# Patient Record
Sex: MALE | Race: WHITE | NOT HISPANIC OR LATINO | URBAN - METROPOLITAN AREA
[De-identification: names, ages, dates, MRNs, and addresses within clinical notes are randomized per-mention and may not be internally consistent; named-entity substitution may affect disease eponyms.]

---

## 2017-01-25 ENCOUNTER — OUTPATIENT (OUTPATIENT)
Dept: OUTPATIENT SERVICES | Facility: HOSPITAL | Age: 82
LOS: 1 days | Discharge: HOME | End: 2017-01-25
Payer: MEDICARE

## 2017-01-25 DIAGNOSIS — M25.552 PAIN IN LEFT HIP: ICD-10-CM

## 2017-01-25 PROCEDURE — 73552 X-RAY EXAM OF FEMUR 2/>: CPT | Mod: 26,LT

## 2017-01-25 PROCEDURE — 73502 X-RAY EXAM HIP UNI 2-3 VIEWS: CPT | Mod: 26,LT

## 2017-01-26 ENCOUNTER — OUTPATIENT (OUTPATIENT)
Dept: OUTPATIENT SERVICES | Facility: HOSPITAL | Age: 82
LOS: 1 days | Discharge: HOME | End: 2017-01-26
Payer: MEDICARE

## 2017-01-26 DIAGNOSIS — R52 PAIN, UNSPECIFIED: ICD-10-CM

## 2017-01-26 PROCEDURE — 73700 CT LOWER EXTREMITY W/O DYE: CPT | Mod: 26,LT

## 2017-01-26 PROCEDURE — 72192 CT PELVIS W/O DYE: CPT | Mod: 26

## 2017-01-26 PROCEDURE — 76377 3D RENDER W/INTRP POSTPROCES: CPT | Mod: 26

## 2017-02-07 ENCOUNTER — OUTPATIENT (OUTPATIENT)
Dept: OUTPATIENT SERVICES | Facility: HOSPITAL | Age: 82
LOS: 1 days | Discharge: HOME | End: 2017-02-07
Payer: MEDICARE

## 2017-02-07 DIAGNOSIS — J18.9 PNEUMONIA, UNSPECIFIED ORGANISM: ICD-10-CM

## 2017-02-07 PROCEDURE — 71010: CPT | Mod: 26

## 2017-02-10 ENCOUNTER — OUTPATIENT (OUTPATIENT)
Dept: OUTPATIENT SERVICES | Facility: HOSPITAL | Age: 82
LOS: 1 days | Discharge: HOME | End: 2017-02-10
Payer: MEDICARE

## 2017-02-10 DIAGNOSIS — M25.552 PAIN IN LEFT HIP: ICD-10-CM

## 2017-02-10 PROCEDURE — 73502 X-RAY EXAM HIP UNI 2-3 VIEWS: CPT | Mod: 26,LT

## 2017-02-12 ENCOUNTER — OUTPATIENT (OUTPATIENT)
Dept: OUTPATIENT SERVICES | Facility: HOSPITAL | Age: 82
LOS: 1 days | Discharge: HOME | End: 2017-02-12
Payer: MEDICARE

## 2017-02-12 DIAGNOSIS — I95.9 HYPOTENSION, UNSPECIFIED: ICD-10-CM

## 2017-02-12 LAB
FLUAV SPEC QL CULT: NEGATIVE — SIGNIFICANT CHANGE UP
FLUBV AG SPEC QL IA: NEGATIVE — SIGNIFICANT CHANGE UP

## 2017-02-12 PROCEDURE — 71010: CPT | Mod: 26

## 2017-02-23 ENCOUNTER — OUTPATIENT (OUTPATIENT)
Dept: OUTPATIENT SERVICES | Facility: HOSPITAL | Age: 82
LOS: 1 days | Discharge: HOME | End: 2017-02-23
Payer: MEDICARE

## 2017-02-23 DIAGNOSIS — J18.9 PNEUMONIA, UNSPECIFIED ORGANISM: ICD-10-CM

## 2017-02-23 PROCEDURE — 71010: CPT | Mod: 26

## 2017-03-04 ENCOUNTER — INPATIENT (INPATIENT)
Facility: HOSPITAL | Age: 82
LOS: 2 days | Discharge: HOME | DRG: 191 | End: 2017-03-07
Attending: HOSPITALIST | Admitting: INTERNAL MEDICINE
Payer: MEDICARE

## 2017-03-04 ENCOUNTER — APPOINTMENT (OUTPATIENT)
Dept: INTERNAL MEDICINE | Facility: CLINIC | Age: 82
End: 2017-03-04

## 2017-03-04 VITALS
WEIGHT: 179.9 LBS | HEART RATE: 80 BPM | RESPIRATION RATE: 18 BRPM | TEMPERATURE: 97 F | DIASTOLIC BLOOD PRESSURE: 84 MMHG | HEIGHT: 68 IN | OXYGEN SATURATION: 95 % | SYSTOLIC BLOOD PRESSURE: 137 MMHG

## 2017-03-04 VITALS
HEART RATE: 75 BPM | SYSTOLIC BLOOD PRESSURE: 90 MMHG | TEMPERATURE: 97.4 F | DIASTOLIC BLOOD PRESSURE: 56 MMHG | OXYGEN SATURATION: 92 %

## 2017-03-04 DIAGNOSIS — N40.0 BENIGN PROSTATIC HYPERPLASIA WITHOUT LOWER URINARY TRACT SYMPTOMS: ICD-10-CM

## 2017-03-04 DIAGNOSIS — J18.9 PNEUMONIA, UNSPECIFIED ORGANISM: ICD-10-CM

## 2017-03-04 DIAGNOSIS — I95.1 ORTHOSTATIC HYPOTENSION: ICD-10-CM

## 2017-03-04 DIAGNOSIS — F33.1 MAJOR DEPRESSIVE DISORDER, RECURRENT, MODERATE: ICD-10-CM

## 2017-03-04 DIAGNOSIS — R06.02 SHORTNESS OF BREATH: ICD-10-CM

## 2017-03-04 DIAGNOSIS — R06.2 WHEEZING: ICD-10-CM

## 2017-03-04 DIAGNOSIS — S72.009A FRACTURE OF UNSPECIFIED PART OF NECK OF UNSPECIFIED FEMUR, INITIAL ENCOUNTER FOR CLOSED FRACTURE: Chronic | ICD-10-CM

## 2017-03-04 DIAGNOSIS — G30.1 ALZHEIMER'S DISEASE WITH LATE ONSET: ICD-10-CM

## 2017-03-04 DIAGNOSIS — J45.901 UNSPECIFIED ASTHMA WITH (ACUTE) EXACERBATION: ICD-10-CM

## 2017-03-04 DIAGNOSIS — S22.49XA MULTIPLE FRACTURES OF RIBS, UNSPECIFIED SIDE, INITIAL ENCOUNTER FOR CLOSED FRACTURE: ICD-10-CM

## 2017-03-04 DIAGNOSIS — R05 COUGH: ICD-10-CM

## 2017-03-04 LAB
ALBUMIN SERPL ELPH-MCNC: 3.9 G/DL — SIGNIFICANT CHANGE UP (ref 3.3–5)
ALP SERPL-CCNC: 115 U/L — SIGNIFICANT CHANGE UP (ref 40–120)
ALT FLD-CCNC: 20 U/L RC — SIGNIFICANT CHANGE UP (ref 10–45)
ANION GAP SERPL CALC-SCNC: 13 MMOL/L — SIGNIFICANT CHANGE UP (ref 5–17)
AST SERPL-CCNC: 23 U/L — SIGNIFICANT CHANGE UP (ref 10–40)
BASOPHILS # BLD AUTO: 0.1 K/UL — SIGNIFICANT CHANGE UP (ref 0–0.2)
BASOPHILS NFR BLD AUTO: 1.1 % — SIGNIFICANT CHANGE UP (ref 0–2)
BILIRUB SERPL-MCNC: 0.5 MG/DL — SIGNIFICANT CHANGE UP (ref 0.2–1.2)
BUN SERPL-MCNC: 21 MG/DL — SIGNIFICANT CHANGE UP (ref 7–23)
CALCIUM SERPL-MCNC: 9.5 MG/DL — SIGNIFICANT CHANGE UP (ref 8.4–10.5)
CHLORIDE SERPL-SCNC: 103 MMOL/L — SIGNIFICANT CHANGE UP (ref 96–108)
CO2 SERPL-SCNC: 27 MMOL/L — SIGNIFICANT CHANGE UP (ref 22–31)
CREAT SERPL-MCNC: 1.11 MG/DL — SIGNIFICANT CHANGE UP (ref 0.5–1.3)
EOSINOPHIL # BLD AUTO: 1.6 K/UL — HIGH (ref 0–0.5)
EOSINOPHIL NFR BLD AUTO: 17.9 % — HIGH (ref 0–6)
GAS PNL BLDV: SIGNIFICANT CHANGE UP
GLUCOSE SERPL-MCNC: 108 MG/DL — HIGH (ref 70–99)
HCT VFR BLD CALC: 41.8 % — SIGNIFICANT CHANGE UP (ref 39–50)
HGB BLD-MCNC: 14 G/DL — SIGNIFICANT CHANGE UP (ref 13–17)
LYMPHOCYTES # BLD AUTO: 1.7 K/UL — SIGNIFICANT CHANGE UP (ref 1–3.3)
LYMPHOCYTES # BLD AUTO: 19.3 % — SIGNIFICANT CHANGE UP (ref 13–44)
MCHC RBC-ENTMCNC: 33.5 GM/DL — SIGNIFICANT CHANGE UP (ref 32–36)
MCHC RBC-ENTMCNC: 33.5 PG — SIGNIFICANT CHANGE UP (ref 27–34)
MCV RBC AUTO: 100 FL — SIGNIFICANT CHANGE UP (ref 80–100)
MONOCYTES # BLD AUTO: 0.7 K/UL — SIGNIFICANT CHANGE UP (ref 0–0.9)
MONOCYTES NFR BLD AUTO: 8 % — SIGNIFICANT CHANGE UP (ref 2–14)
NEUTROPHILS # BLD AUTO: 4.7 K/UL — SIGNIFICANT CHANGE UP (ref 1.8–7.4)
NEUTROPHILS NFR BLD AUTO: 53.7 % — SIGNIFICANT CHANGE UP (ref 43–77)
PLATELET # BLD AUTO: 124 K/UL — LOW (ref 150–400)
POTASSIUM SERPL-MCNC: 4.6 MMOL/L — SIGNIFICANT CHANGE UP (ref 3.5–5.3)
POTASSIUM SERPL-SCNC: 4.6 MMOL/L — SIGNIFICANT CHANGE UP (ref 3.5–5.3)
PROT SERPL-MCNC: 7.8 G/DL — SIGNIFICANT CHANGE UP (ref 6–8.3)
RAPID RVP RESULT: SIGNIFICANT CHANGE UP
RBC # BLD: 4.18 M/UL — LOW (ref 4.2–5.8)
RBC # FLD: 13.7 % — SIGNIFICANT CHANGE UP (ref 10.3–14.5)
SODIUM SERPL-SCNC: 143 MMOL/L — SIGNIFICANT CHANGE UP (ref 135–145)
WBC # BLD: 8.7 K/UL — SIGNIFICANT CHANGE UP (ref 3.8–10.5)
WBC # FLD AUTO: 8.7 K/UL — SIGNIFICANT CHANGE UP (ref 3.8–10.5)

## 2017-03-04 PROCEDURE — 71010: CPT | Mod: 26

## 2017-03-04 PROCEDURE — 99285 EMERGENCY DEPT VISIT HI MDM: CPT | Mod: GC

## 2017-03-04 PROCEDURE — 99223 1ST HOSP IP/OBS HIGH 75: CPT

## 2017-03-04 PROCEDURE — 71250 CT THORAX DX C-: CPT | Mod: 26

## 2017-03-04 RX ORDER — BUPROPION HYDROCHLORIDE 150 MG/1
75 TABLET, EXTENDED RELEASE ORAL DAILY
Qty: 0 | Refills: 0 | Status: DISCONTINUED | OUTPATIENT
Start: 2017-03-05 | End: 2017-03-07

## 2017-03-04 RX ORDER — HEPARIN SODIUM 5000 [USP'U]/ML
5000 INJECTION INTRAVENOUS; SUBCUTANEOUS EVERY 8 HOURS
Qty: 0 | Refills: 0 | Status: DISCONTINUED | OUTPATIENT
Start: 2017-03-04 | End: 2017-03-07

## 2017-03-04 RX ORDER — SERTRALINE 25 MG/1
50 TABLET, FILM COATED ORAL
Qty: 0 | Refills: 0 | Status: DISCONTINUED | OUTPATIENT
Start: 2017-03-04 | End: 2017-03-07

## 2017-03-04 RX ORDER — IPRATROPIUM/ALBUTEROL SULFATE 18-103MCG
3 AEROSOL WITH ADAPTER (GRAM) INHALATION ONCE
Qty: 0 | Refills: 0 | Status: COMPLETED | OUTPATIENT
Start: 2017-03-04 | End: 2017-03-04

## 2017-03-04 RX ORDER — SERTRALINE 25 MG/1
1 TABLET, FILM COATED ORAL
Qty: 0 | Refills: 0 | COMMUNITY

## 2017-03-04 RX ORDER — LANOLIN ALCOHOL/MO/W.PET/CERES
1 CREAM (GRAM) TOPICAL
Qty: 0 | Refills: 0 | COMMUNITY

## 2017-03-04 RX ORDER — IPRATROPIUM/ALBUTEROL SULFATE 18-103MCG
3 AEROSOL WITH ADAPTER (GRAM) INHALATION EVERY 4 HOURS
Qty: 0 | Refills: 0 | Status: DISCONTINUED | OUTPATIENT
Start: 2017-03-04 | End: 2017-03-07

## 2017-03-04 RX ORDER — MIDODRINE HYDROCHLORIDE 2.5 MG/1
0 TABLET ORAL
Qty: 0 | Refills: 0 | COMMUNITY

## 2017-03-04 RX ORDER — ASPIRIN/CALCIUM CARB/MAGNESIUM 324 MG
1 TABLET ORAL
Qty: 0 | Refills: 0 | COMMUNITY

## 2017-03-04 RX ORDER — LANOLIN ALCOHOL/MO/W.PET/CERES
3 CREAM (GRAM) TOPICAL AT BEDTIME
Qty: 0 | Refills: 0 | Status: DISCONTINUED | OUTPATIENT
Start: 2017-03-04 | End: 2017-03-07

## 2017-03-04 RX ORDER — DONEPEZIL HYDROCHLORIDE 10 MG/1
10 TABLET, FILM COATED ORAL AT BEDTIME
Qty: 0 | Refills: 0 | Status: DISCONTINUED | OUTPATIENT
Start: 2017-03-04 | End: 2017-03-07

## 2017-03-04 RX ORDER — TAMSULOSIN HYDROCHLORIDE 0.4 MG/1
1 CAPSULE ORAL
Qty: 0 | Refills: 0 | COMMUNITY

## 2017-03-04 RX ORDER — BUPROPION HYDROCHLORIDE 150 MG/1
1 TABLET, EXTENDED RELEASE ORAL
Qty: 0 | Refills: 0 | COMMUNITY

## 2017-03-04 RX ORDER — VANCOMYCIN HCL 1 G
1000 VIAL (EA) INTRAVENOUS ONCE
Qty: 0 | Refills: 0 | Status: COMPLETED | OUTPATIENT
Start: 2017-03-04 | End: 2017-03-04

## 2017-03-04 RX ORDER — AZITHROMYCIN 500 MG/1
500 TABLET, FILM COATED ORAL ONCE
Qty: 0 | Refills: 0 | Status: COMPLETED | OUTPATIENT
Start: 2017-03-04 | End: 2017-03-04

## 2017-03-04 RX ORDER — DONEPEZIL HYDROCHLORIDE 10 MG/1
1 TABLET, FILM COATED ORAL
Qty: 0 | Refills: 0 | COMMUNITY

## 2017-03-04 RX ORDER — MIDODRINE HYDROCHLORIDE 2.5 MG/1
5 TABLET ORAL THREE TIMES A DAY
Qty: 0 | Refills: 0 | Status: DISCONTINUED | OUTPATIENT
Start: 2017-03-04 | End: 2017-03-07

## 2017-03-04 RX ORDER — PREGABALIN 225 MG/1
1 CAPSULE ORAL
Qty: 0 | Refills: 0 | COMMUNITY

## 2017-03-04 RX ORDER — BUPROPION HYDROCHLORIDE 150 MG/1
75 TABLET, EXTENDED RELEASE ORAL DAILY
Qty: 0 | Refills: 0 | Status: DISCONTINUED | OUTPATIENT
Start: 2017-03-04 | End: 2017-03-04

## 2017-03-04 RX ORDER — PREGABALIN 225 MG/1
1000 CAPSULE ORAL DAILY
Qty: 0 | Refills: 0 | Status: DISCONTINUED | OUTPATIENT
Start: 2017-03-04 | End: 2017-03-07

## 2017-03-04 RX ORDER — ASPIRIN/CALCIUM CARB/MAGNESIUM 324 MG
81 TABLET ORAL DAILY
Qty: 0 | Refills: 0 | Status: DISCONTINUED | OUTPATIENT
Start: 2017-03-04 | End: 2017-03-07

## 2017-03-04 RX ORDER — TAMSULOSIN HYDROCHLORIDE 0.4 MG/1
0.4 CAPSULE ORAL AT BEDTIME
Qty: 0 | Refills: 0 | Status: DISCONTINUED | OUTPATIENT
Start: 2017-03-04 | End: 2017-03-07

## 2017-03-04 RX ORDER — ACETAMINOPHEN 500 MG
1000 TABLET ORAL ONCE
Qty: 0 | Refills: 0 | Status: COMPLETED | OUTPATIENT
Start: 2017-03-04 | End: 2017-03-04

## 2017-03-04 RX ORDER — MIDODRINE HYDROCHLORIDE 2.5 MG/1
1 TABLET ORAL
Qty: 0 | Refills: 0 | COMMUNITY

## 2017-03-04 RX ORDER — SODIUM CHLORIDE 9 MG/ML
1000 INJECTION INTRAMUSCULAR; INTRAVENOUS; SUBCUTANEOUS ONCE
Qty: 0 | Refills: 0 | Status: COMPLETED | OUTPATIENT
Start: 2017-03-04 | End: 2017-03-04

## 2017-03-04 RX ORDER — SERTRALINE 25 MG/1
0 TABLET, FILM COATED ORAL
Qty: 0 | Refills: 0 | COMMUNITY

## 2017-03-04 RX ORDER — ACETAMINOPHEN 500 MG
650 TABLET ORAL EVERY 6 HOURS
Qty: 0 | Refills: 0 | Status: DISCONTINUED | OUTPATIENT
Start: 2017-03-04 | End: 2017-03-07

## 2017-03-04 RX ORDER — AZTREONAM 2 G
2000 VIAL (EA) INJECTION ONCE
Qty: 0 | Refills: 0 | Status: COMPLETED | OUTPATIENT
Start: 2017-03-04 | End: 2017-03-04

## 2017-03-04 RX ADMIN — Medication 400 MILLIGRAM(S): at 13:07

## 2017-03-04 RX ADMIN — TAMSULOSIN HYDROCHLORIDE 0.4 MILLIGRAM(S): 0.4 CAPSULE ORAL at 21:50

## 2017-03-04 RX ADMIN — Medication 20 MILLIGRAM(S): at 21:50

## 2017-03-04 RX ADMIN — Medication 3 MILLILITER(S): at 22:08

## 2017-03-04 RX ADMIN — Medication 250 MILLIGRAM(S): at 15:53

## 2017-03-04 RX ADMIN — DONEPEZIL HYDROCHLORIDE 10 MILLIGRAM(S): 10 TABLET, FILM COATED ORAL at 22:06

## 2017-03-04 RX ADMIN — SODIUM CHLORIDE 1000 MILLILITER(S): 9 INJECTION INTRAMUSCULAR; INTRAVENOUS; SUBCUTANEOUS at 13:07

## 2017-03-04 RX ADMIN — Medication 125 MILLIGRAM(S): at 13:49

## 2017-03-04 RX ADMIN — Medication 1000 MILLIGRAM(S): at 13:44

## 2017-03-04 RX ADMIN — HEPARIN SODIUM 5000 UNIT(S): 5000 INJECTION INTRAVENOUS; SUBCUTANEOUS at 21:50

## 2017-03-04 RX ADMIN — MIDODRINE HYDROCHLORIDE 5 MILLIGRAM(S): 2.5 TABLET ORAL at 22:07

## 2017-03-04 RX ADMIN — Medication 3 MILLIGRAM(S): at 22:17

## 2017-03-04 RX ADMIN — Medication 3 MILLILITER(S): at 17:49

## 2017-03-04 RX ADMIN — SERTRALINE 50 MILLIGRAM(S): 25 TABLET, FILM COATED ORAL at 17:51

## 2017-03-04 RX ADMIN — Medication 3 MILLILITER(S): at 13:07

## 2017-03-04 RX ADMIN — AZITHROMYCIN 250 MILLIGRAM(S): 500 TABLET, FILM COATED ORAL at 21:40

## 2017-03-04 RX ADMIN — Medication 100 MILLIGRAM(S): at 17:48

## 2017-03-04 NOTE — ED ADULT NURSE NOTE - OBJECTIVE STATEMENT
85 y/o M presents to the ED c/o of SOB and a productive cough.  Patients family states the patient has been coughing for 1 week and has been bringing up some black mucous.  Patients family state the patient had hip fracture on January 11th and was treated no surgically at [facility] and the family also states the patient had pneumonia 2 weeks ago and was treated with antibiotics and was sent home with nebulizer.  Patient is A&Ox4. Face is symmetrical. PERRL 3mmB.  Patient is a little hard of hearing.  Patient has bilateral wheezes.  Respirations are even and shallow.   Family states the patient lives alone with a 24 hr home aid.  Family at bedside.  MD at bedside.  Patient safety and comfort measures provided.

## 2017-03-04 NOTE — H&P ADULT. - PROBLEM SELECTOR PLAN 2
Seen on CT chest - age indeterminate findings, likely old. Patient is asymptomatic (no pain), no atelectasis on CT chest.  Discussed the case with Trauma surgery (not an official consult) - they have no acute recommendations for this given lack of symptoms or findings.

## 2017-03-04 NOTE — H&P ADULT. - FAMILY HISTORY
Mother  Still living? Unknown  Family history of CHF (congestive heart failure), Age at diagnosis: Age Unknown     Father  Still living? Unknown  Family history of MI (myocardial infarction), Age at diagnosis: Age Unknown

## 2017-03-04 NOTE — H&P ADULT. - LAB RESULTS AND INTERPRETATION
Labs reviewed: lactate mildly elevated, no leukocytosis, not anemic, mild thrombocytopenia, RVP negative

## 2017-03-04 NOTE — H&P ADULT. - ASSESSMENT
87yo Man with PMH of mild dementia, AAA (3.5 cm on 11/2015 check), h/o partial left hip replacement, possible MM (lytic bone lesions, anemic, +SPEP, no further work up per family request), h/o multiple mechanical falls with rib fractures, and most recently left "hip" fracture treated nonsurgically by orthopedics (touch toe weight bearing at [facility] for several weeks), BPH, orthostatic hypotension on midodrine, recent PNA s/p amoxicillin at [facility], discharged from rehab on 2/25 with Duonebs INH q6h, presents with cough.

## 2017-03-04 NOTE — H&P ADULT. - HISTORY OF PRESENT ILLNESS
85yo Man with PMH of mild dementia, AAA (3.5 cm on 11/2015 check), h/o partial left hip replacement, possible MM (lytic bone lesions, anemic, +SPEP, no further work up per family request), h/o multiple mechanical falls with rib fractures, and most recently left "hip" fracture treated nonsurgically by orthopedics (touch toe weight bearing at [facility] for several weeks), BPH, orthostatic hypotension, recent RNA s/p amoxicillin at [facility], discharged from rehab on 2/25 with Duonebs INH q6h, presents with cough. The patient is a poor historian; therefore, most of the history was obtained from his HCP (daughter Irma Fry cell #[phone]). After being discharged from rehab a week ago, he has been complaint with the nebulizer, but continued to cough with increasing frequency. The visiting nurse saw him twice and felt the lung sounds were worsening and referred him to the PMD. This morning Dr. Hunter (covering from Dr. Elizabeth) sent him to the hospital to r/o PNA. In the ED, vitals T 97.3, HR 80, /84, RR 18, 95% RA. Given azithromycin 500mg IV x1, Aztreonam 2g IV x1, Vancomycin 1g IV x1, NS 1L IV x1, tylenol 1g, duonebs x1, Solu-Medrol 125mg IVP x1. Admitted to medicine for further care. 87yo Man with PMH of mild dementia, AAA (3.5 cm on 11/2015 check), h/o partial left hip replacement, possible MM (lytic bone lesions, anemic, +SPEP, no further work up per family request), h/o multiple mechanical falls with rib fractures, and most recently left "hip" fracture treated nonsurgically by orthopedics (touch toe weight bearing at [facility] for several weeks), BPH, orthostatic hypotension on midodrine, recent PNA s/p amoxicillin at [facility], discharged from rehab on 2/25 with Duonebs INH q6h, presents with cough. The patient is a poor historian; therefore, most of the history was obtained from his HCP (daughter Irma Fry cell #[phone]). After being discharged from rehab a week ago, he has been complaint with the nebulizer, but continued to cough with increasing frequency. The visiting nurse saw him twice and felt the lung sounds were worsening and referred him to the PMD. This morning Dr. Cosby (covering for Dr. Elizabeth) sent him to the hospital to r/o PNA. In the ED, vitals T 97.3, HR 80, /84, RR 18, 95% RA. Given azithromycin 500mg IV x1, Aztreonam 2g IV x1, Vancomycin 1g IV x1, NS 1L IV x1, tylenol 1g, duonebs x1, Solu-Medrol 125mg IVP x1. Admitted to medicine for further care.

## 2017-03-04 NOTE — H&P ADULT. - PROBLEM SELECTOR PLAN 1
No evidence of bacterial infection such as pneumonia - afebrile, no leukocytosis.  He has been having significant wheezing, coughing, +RSV infection in February.   Will monitor off antibiotics.  Start Solu-Medrol 20mg IV q8h  Duonebs q4h standing

## 2017-03-04 NOTE — ED PROVIDER NOTE - NS ED ROS FT
ROS: No fever/chills, no eye pain, no throat pain, no chest pain, cough,  no shortness of breath, no abdominal pain,  no dysuria, no muscle pain, no rashes, no focal neurologic complaints, no known mental health issues

## 2017-03-04 NOTE — ED PROVIDER NOTE - MEDICAL DECISION MAKING DETAILS
87 yo male with history of dementia and recent nonoperative hip fx as well as RSV infection 1 month ago with persistent wet cough, no fevers, no CP.  Mild expiratory wheezes on exam with L sided rhonchi.  Will give nebs, check CXR, RVP, rule out infectious process.

## 2017-03-04 NOTE — ED PROVIDER NOTE - PHYSICAL EXAMINATION
Kyra: A & O x 3, NAD, HEENT WNL and no facial asymmetry; lungs with poor air movement with faint wheezing bilaterally, coughing on exam, heart with reg rhythm without murmur; abdomen soft NTND; extremities with no edema; skin with no rashes, neuro exam non focal with no motor or sensory deficits

## 2017-03-04 NOTE — ED PROVIDER NOTE - PROGRESS NOTE DETAILS
Kyra PGY2: xray showing effusion and fracture on left side. ct ordered. patient family now reporting history of sliding out of chair one week ago. possible source of fxr. patient had no dx of rib on in initial [facility] work up for fall Kyra PGY2: age indeterminate  rib fractures on CT scan ribs 3-9, reevaluate patient and remains nontender in chest wall with no ecchymosis. relayed findings to Dr. Trinh. no trauma consult indicated at this time.

## 2017-03-04 NOTE — ED PROVIDER NOTE - OBJECTIVE STATEMENT
86 year old, [facility] aaa (non op), dementia, history of hip fracture on January 11 treated at [facility], treated non-operatively, finally discharged home from rehab last week. Lives alone with a 24 hour aid. recently treated for PNA at rehab a few weeks ago. patient seen in office by Dr. Cosby (affiliate of [facility]) sent in for concern for PNA. presenting with productive cough, fatigued no history of fevers. Treated with amoxicillin for pna a few weeks ago.     family states there are concerns for Multiple Myeloma (lytic lesions in neck, anemia, serum electropheresis posistive , no urine samples obtaine) family doesn't want to pursue DX because they know they wont pursue treamtent if formal diagnosis is made.     PMD: suhas plata 86 year old, [facility] aaa (non op), dementia, history of hip fracture on January 11 treated at [facility], treated non-operatively, finally discharged home from rehab last week. Lives alone with a 24 hour aid. recently treated for PNA at rehab a few weeks ago. patient seen in office by Dr. Cosby (affiliate of [facility]) sent in for concern for PNA. presenting with productive cough, fatigued no history of fevers. Treated with amoxicillin for pna a few weeks ago.     family states there are concerns for Multiple Myeloma (lytic lesions in neck, anemia, serum electropheresis posistive , no urine samples obtaine) family doesn't want to pursue DX because they know they wont pursue treatment if formal diagnosis is made.     PMD: suhas plata ([facility] physician [facility])

## 2017-03-04 NOTE — H&P ADULT. - RADIOLOGY RESULTS AND INTERPRETATION
CXR personally reviewed: left base atelectasis   CT chest: Age-indeterminate left 3-9th rib fractures. Additional old bilateral rib fractures. Mucoid impacted distal airways mainly in the lingula and bilateral lung bases, possibly superimposed bronchitis.

## 2017-03-04 NOTE — ED PROVIDER NOTE - INTERPRETATION
EKG reviewed for rate, rhythm, axis, intervals and segments, including QRS morphology, P wave appearance T wave appearance, NJ interval, and QT interval.  I find the EKG to be unremarkable in all of these regards except as follows: atrial fibrillation

## 2017-03-05 PROCEDURE — 99232 SBSQ HOSP IP/OBS MODERATE 35: CPT

## 2017-03-05 PROCEDURE — 99233 SBSQ HOSP IP/OBS HIGH 50: CPT

## 2017-03-05 RX ADMIN — Medication 3 MILLILITER(S): at 05:58

## 2017-03-05 RX ADMIN — PREGABALIN 1000 MICROGRAM(S): 225 CAPSULE ORAL at 11:48

## 2017-03-05 RX ADMIN — TAMSULOSIN HYDROCHLORIDE 0.4 MILLIGRAM(S): 0.4 CAPSULE ORAL at 22:10

## 2017-03-05 RX ADMIN — BUPROPION HYDROCHLORIDE 75 MILLIGRAM(S): 150 TABLET, EXTENDED RELEASE ORAL at 11:48

## 2017-03-05 RX ADMIN — SERTRALINE 50 MILLIGRAM(S): 25 TABLET, FILM COATED ORAL at 05:59

## 2017-03-05 RX ADMIN — Medication 3 MILLILITER(S): at 22:11

## 2017-03-05 RX ADMIN — Medication 81 MILLIGRAM(S): at 11:48

## 2017-03-05 RX ADMIN — Medication 3 MILLILITER(S): at 03:00

## 2017-03-05 RX ADMIN — HEPARIN SODIUM 5000 UNIT(S): 5000 INJECTION INTRAVENOUS; SUBCUTANEOUS at 15:02

## 2017-03-05 RX ADMIN — SERTRALINE 50 MILLIGRAM(S): 25 TABLET, FILM COATED ORAL at 17:40

## 2017-03-05 RX ADMIN — HEPARIN SODIUM 5000 UNIT(S): 5000 INJECTION INTRAVENOUS; SUBCUTANEOUS at 05:58

## 2017-03-05 RX ADMIN — Medication 3 MILLILITER(S): at 17:40

## 2017-03-05 RX ADMIN — Medication 20 MILLIGRAM(S): at 22:11

## 2017-03-05 RX ADMIN — Medication 20 MILLIGRAM(S): at 05:58

## 2017-03-05 RX ADMIN — MIDODRINE HYDROCHLORIDE 5 MILLIGRAM(S): 2.5 TABLET ORAL at 22:10

## 2017-03-05 RX ADMIN — MIDODRINE HYDROCHLORIDE 5 MILLIGRAM(S): 2.5 TABLET ORAL at 05:59

## 2017-03-05 RX ADMIN — Medication 3 MILLIGRAM(S): at 22:11

## 2017-03-05 RX ADMIN — Medication 20 MILLIGRAM(S): at 15:01

## 2017-03-05 RX ADMIN — DONEPEZIL HYDROCHLORIDE 10 MILLIGRAM(S): 10 TABLET, FILM COATED ORAL at 22:10

## 2017-03-05 RX ADMIN — Medication 3 MILLILITER(S): at 09:23

## 2017-03-05 RX ADMIN — Medication 3 MILLILITER(S): at 14:30

## 2017-03-05 RX ADMIN — HEPARIN SODIUM 5000 UNIT(S): 5000 INJECTION INTRAVENOUS; SUBCUTANEOUS at 22:11

## 2017-03-05 RX ADMIN — MIDODRINE HYDROCHLORIDE 5 MILLIGRAM(S): 2.5 TABLET ORAL at 15:02

## 2017-03-06 ENCOUNTER — TRANSCRIPTION ENCOUNTER (OUTPATIENT)
Age: 82
End: 2017-03-06

## 2017-03-06 LAB
ANION GAP SERPL CALC-SCNC: 12 MMOL/L — SIGNIFICANT CHANGE UP (ref 5–17)
BASOPHILS # BLD AUTO: 0.01 K/UL — SIGNIFICANT CHANGE UP (ref 0–0.2)
BASOPHILS NFR BLD AUTO: 0.1 % — SIGNIFICANT CHANGE UP (ref 0–2)
BUN SERPL-MCNC: 23 MG/DL — SIGNIFICANT CHANGE UP (ref 7–23)
CALCIUM SERPL-MCNC: 9.4 MG/DL — SIGNIFICANT CHANGE UP (ref 8.4–10.5)
CHLORIDE SERPL-SCNC: 104 MMOL/L — SIGNIFICANT CHANGE UP (ref 96–108)
CO2 SERPL-SCNC: 24 MMOL/L — SIGNIFICANT CHANGE UP (ref 22–31)
CREAT SERPL-MCNC: 0.98 MG/DL — SIGNIFICANT CHANGE UP (ref 0.5–1.3)
EOSINOPHIL # BLD AUTO: 0.01 K/UL — SIGNIFICANT CHANGE UP (ref 0–0.5)
EOSINOPHIL NFR BLD AUTO: 0.1 % — SIGNIFICANT CHANGE UP (ref 0–6)
GLUCOSE SERPL-MCNC: 109 MG/DL — HIGH (ref 70–99)
HCT VFR BLD CALC: 34.8 % — LOW (ref 39–50)
HGB BLD-MCNC: 11.2 G/DL — LOW (ref 13–17)
IMM GRANULOCYTES NFR BLD AUTO: 0.2 % — SIGNIFICANT CHANGE UP (ref 0–1.5)
LYMPHOCYTES # BLD AUTO: 1.02 K/UL — SIGNIFICANT CHANGE UP (ref 1–3.3)
LYMPHOCYTES # BLD AUTO: 11.4 % — LOW (ref 13–44)
MCHC RBC-ENTMCNC: 31.5 PG — SIGNIFICANT CHANGE UP (ref 27–34)
MCHC RBC-ENTMCNC: 32.2 GM/DL — SIGNIFICANT CHANGE UP (ref 32–36)
MCV RBC AUTO: 98 FL — SIGNIFICANT CHANGE UP (ref 80–100)
MONOCYTES # BLD AUTO: 0.42 K/UL — SIGNIFICANT CHANGE UP (ref 0–0.9)
MONOCYTES NFR BLD AUTO: 4.7 % — SIGNIFICANT CHANGE UP (ref 2–14)
NEUTROPHILS # BLD AUTO: 7.48 K/UL — HIGH (ref 1.8–7.4)
NEUTROPHILS NFR BLD AUTO: 83.5 % — HIGH (ref 43–77)
PLATELET # BLD AUTO: 138 K/UL — LOW (ref 150–400)
POTASSIUM SERPL-MCNC: 4.7 MMOL/L — SIGNIFICANT CHANGE UP (ref 3.5–5.3)
POTASSIUM SERPL-SCNC: 4.7 MMOL/L — SIGNIFICANT CHANGE UP (ref 3.5–5.3)
RBC # BLD: 3.55 M/UL — LOW (ref 4.2–5.8)
RBC # FLD: 14.7 % — HIGH (ref 10.3–14.5)
SODIUM SERPL-SCNC: 140 MMOL/L — SIGNIFICANT CHANGE UP (ref 135–145)
WBC # BLD: 8.96 K/UL — SIGNIFICANT CHANGE UP (ref 3.8–10.5)
WBC # FLD AUTO: 8.96 K/UL — SIGNIFICANT CHANGE UP (ref 3.8–10.5)

## 2017-03-06 PROCEDURE — 72170 X-RAY EXAM OF PELVIS: CPT | Mod: 26

## 2017-03-06 PROCEDURE — 73552 X-RAY EXAM OF FEMUR 2/>: CPT | Mod: 26,LT

## 2017-03-06 PROCEDURE — 99233 SBSQ HOSP IP/OBS HIGH 50: CPT

## 2017-03-06 RX ORDER — IPRATROPIUM/ALBUTEROL SULFATE 18-103MCG
3 AEROSOL WITH ADAPTER (GRAM) INHALATION
Qty: 0 | Refills: 0 | COMMUNITY
Start: 2017-03-06

## 2017-03-06 RX ADMIN — DONEPEZIL HYDROCHLORIDE 10 MILLIGRAM(S): 10 TABLET, FILM COATED ORAL at 21:51

## 2017-03-06 RX ADMIN — MIDODRINE HYDROCHLORIDE 5 MILLIGRAM(S): 2.5 TABLET ORAL at 13:54

## 2017-03-06 RX ADMIN — SERTRALINE 50 MILLIGRAM(S): 25 TABLET, FILM COATED ORAL at 17:27

## 2017-03-06 RX ADMIN — Medication 20 MILLIGRAM(S): at 13:54

## 2017-03-06 RX ADMIN — PREGABALIN 1000 MICROGRAM(S): 225 CAPSULE ORAL at 11:03

## 2017-03-06 RX ADMIN — Medication 3 MILLILITER(S): at 13:54

## 2017-03-06 RX ADMIN — HEPARIN SODIUM 5000 UNIT(S): 5000 INJECTION INTRAVENOUS; SUBCUTANEOUS at 21:51

## 2017-03-06 RX ADMIN — Medication 3 MILLIGRAM(S): at 21:51

## 2017-03-06 RX ADMIN — HEPARIN SODIUM 5000 UNIT(S): 5000 INJECTION INTRAVENOUS; SUBCUTANEOUS at 06:07

## 2017-03-06 RX ADMIN — Medication 3 MILLILITER(S): at 02:59

## 2017-03-06 RX ADMIN — Medication 81 MILLIGRAM(S): at 11:02

## 2017-03-06 RX ADMIN — TAMSULOSIN HYDROCHLORIDE 0.4 MILLIGRAM(S): 0.4 CAPSULE ORAL at 21:51

## 2017-03-06 RX ADMIN — MIDODRINE HYDROCHLORIDE 5 MILLIGRAM(S): 2.5 TABLET ORAL at 21:51

## 2017-03-06 RX ADMIN — Medication 3 MILLILITER(S): at 17:27

## 2017-03-06 RX ADMIN — Medication 3 MILLILITER(S): at 21:51

## 2017-03-06 RX ADMIN — MIDODRINE HYDROCHLORIDE 5 MILLIGRAM(S): 2.5 TABLET ORAL at 06:07

## 2017-03-06 RX ADMIN — HEPARIN SODIUM 5000 UNIT(S): 5000 INJECTION INTRAVENOUS; SUBCUTANEOUS at 13:54

## 2017-03-06 RX ADMIN — Medication 3 MILLILITER(S): at 06:06

## 2017-03-06 RX ADMIN — Medication 3 MILLILITER(S): at 09:57

## 2017-03-06 RX ADMIN — SERTRALINE 50 MILLIGRAM(S): 25 TABLET, FILM COATED ORAL at 06:06

## 2017-03-06 RX ADMIN — BUPROPION HYDROCHLORIDE 75 MILLIGRAM(S): 150 TABLET, EXTENDED RELEASE ORAL at 11:02

## 2017-03-06 RX ADMIN — Medication 20 MILLIGRAM(S): at 06:06

## 2017-03-06 NOTE — PHYSICAL THERAPY INITIAL EVALUATION ADULT - GAIT DEVIATIONS NOTED, PT EVAL
decreased alyson/decreased stride length/decreased weight-shifting ability/decreased step length/unable to maintain toe touch weightbearing

## 2017-03-06 NOTE — DISCHARGE NOTE ADULT - PLAN OF CARE
complete course of prednisone use nebulizer treatments 2x per day continue with midodrine You can now bear-weight as tolerated on your left leg use nebulizer treatments 2x per day  Add advair 2x per day

## 2017-03-06 NOTE — DISCHARGE NOTE ADULT - CARE PLAN
Principal Discharge DX:	Reactive airway disease with acute exacerbation  Goal:	complete course of prednisone  Instructions for follow-up, activity and diet:	use nebulizer treatments 2x per day  Secondary Diagnosis:	Late onset Alzheimer's disease without behavioral disturbance  Secondary Diagnosis:	Orthostatic hypotension  Secondary Diagnosis:	Hip fracture  Secondary Diagnosis:	BPH (benign prostatic hyperplasia) Principal Discharge DX:	Reactive airway disease with acute exacerbation  Goal:	complete course of prednisone  Instructions for follow-up, activity and diet:	use nebulizer treatments 2x per day  Add advair 2x per day  Secondary Diagnosis:	Late onset Alzheimer's disease without behavioral disturbance  Secondary Diagnosis:	Orthostatic hypotension  Goal:	continue with midodrine  Secondary Diagnosis:	Hip fracture  Goal:	You can now bear-weight as tolerated on your left leg  Secondary Diagnosis:	BPH (benign prostatic hyperplasia)

## 2017-03-06 NOTE — DISCHARGE NOTE ADULT - MEDICATION SUMMARY - MEDICATIONS TO TAKE
I will START or STAY ON the medications listed below when I get home from the hospital:    predniSONE 10 mg oral tablet  -- 3 tab(s) by mouth once a day  -- Indication: For Reactive airway disease with acute exacerbation    aspirin 81 mg oral tablet  -- 1 tab(s) by mouth once a day  -- Indication: For AAA (abdominal aortic aneurysm)    tamsulosin 0.4 mg oral capsule  -- 1 cap(s) by mouth once a day (at bedtime)  -- Indication: For enlarged prostate    Zoloft 50 mg oral tablet  -- 1 tab(s) by mouth 2 times a day  -- Indication: For Depression    albuterol-ipratropium 2.5 mg-0.5 mg/3 mL inhalation solution  -- 3 milliliter(s) inhaled 2 times a day  -- Indication: For Reactive airway disease with acute exacerbation    donepezil 10 mg oral tablet  -- 1 tab(s) by mouth once a day (at bedtime)  -- Indication: For Dementia    midodrine 5 mg oral tablet  -- 1 tab(s) by mouth 3 times a day  -- Indication: For Orthostatic hypotension    melatonin 3 mg oral tablet  -- 1 tab(s) by mouth once (at bedtime)  -- Indication: For sleep supplement    Wellbutrin 75 mg oral tablet  -- 1 tab(s) by mouth once a day  -- Indication: For Depression    Vitamin B12 1000 mcg oral tablet  -- 1 tab(s) by mouth once a day  -- Indication: For supplement I will START or STAY ON the medications listed below when I get home from the hospital:    predniSONE 10 mg oral tablet  -- 3 tab(s) by mouth once a day  -- Indication: For Reactive airway disease with acute exacerbation    aspirin 81 mg oral tablet  -- 1 tab(s) by mouth once a day  -- Indication: For AAA (abdominal aortic aneurysm)    tamsulosin 0.4 mg oral capsule  -- 1 cap(s) by mouth once a day (at bedtime)  -- Indication: For enlarged prostate    Zoloft 50 mg oral tablet  -- 1 tab(s) by mouth 2 times a day  -- Indication: For Depression    albuterol-ipratropium 2.5 mg-0.5 mg/3 mL inhalation solution  -- 3 milliliter(s) inhaled 2 times a day  -- Indication: For Reactive airway disease with acute exacerbation    fluticasone-salmeterol 100 mcg-50 mcg inhalation powder  -- 1 puff(s) inhaled 2 times a day  -- Indication: For Bronchiectasis    donepezil 10 mg oral tablet  -- 1 tab(s) by mouth once a day (at bedtime)  -- Indication: For Dementia    midodrine 5 mg oral tablet  -- 1 tab(s) by mouth 3 times a day  -- Indication: For Orthostatic hypotension    melatonin 3 mg oral tablet  -- 1 tab(s) by mouth once (at bedtime)  -- Indication: For sleep supplement    Wellbutrin 75 mg oral tablet  -- 1 tab(s) by mouth once a day  -- Indication: For Depression    Vitamin B12 1000 mcg oral tablet  -- 1 tab(s) by mouth once a day  -- Indication: For supplement

## 2017-03-06 NOTE — DISCHARGE NOTE ADULT - CARE PROVIDER_API CALL
Brian Elizabeth), Internal Medicine  [address] [place] [address]  Phone: [phone]  Fax: [phone]

## 2017-03-06 NOTE — DISCHARGE NOTE ADULT - SECONDARY DIAGNOSIS.
Late onset Alzheimer's disease without behavioral disturbance Orthostatic hypotension Hip fracture BPH (benign prostatic hyperplasia)

## 2017-03-06 NOTE — DISCHARGE NOTE ADULT - CARE PROVIDERS DIRECT ADDRESSES
[email or web address]

## 2017-03-06 NOTE — DISCHARGE NOTE ADULT - HOME CARE AGENCY
Your home care services has been referred  to [facility]: [phone]  for Registered  Nurse visit the day after your discharge from the hospital. Please call them to inquire about time of visit.

## 2017-03-06 NOTE — DISCHARGE NOTE ADULT - HOSPITAL COURSE
86yoM hx AAA, BPH, dementia, subtroch periprosthetic L hip fx 1/17, course complicated by RSV pneumonia, discharged from rehab and p/w shortness of breath found with bronchitis/mucoid impaction, reactive airway disease with acute exacerbation.  1. RAD with acute exacerbation complete 5 days of steroids - wheezing resolved, bringing up sputum.  Oxygen saturation off oxygen documented as... c/w nebulizer treatments  2. R hip fracture, non operative was toe touch weight bearing since beginning of January.  Repeat films show...  3. Dementia - stable c/w aricept    Plan to discharge home with 24HHA and home PT. 86yoM hx AAA, BPH, dementia, subtroch periprosthetic L hip fx 1/17, course complicated by RSV pneumonia, discharged from rehab and p/w shortness of breath found with bronchitis/mucoid impaction, reactive airway disease with acute exacerbation.  1. RAD with acute exacerbation, likely bronchiectasis complete 5 days of steroids - wheezing resolved, bringing up sputum.  Oxygen saturation off oxygen documented as 93-95% on ambulation c/w nebulizer treatments, add advair. CT with no evidence of pneumonia, patient with no fever/chills  2. R hip fracture, non operative was toe touch weight bearing since beginning of January.  Repeat films show and ortho follow can now advance to WBAT  3. Dementia - stable c/w aricept  4. Depression - continue with current treatment  Plan to discharge home with 24HHA and home PT.  Discussed with daughter at length who agrees with plan as above. Called Dr. Elizabeth

## 2017-03-06 NOTE — PHYSICAL THERAPY INITIAL EVALUATION ADULT - PERTINENT HX OF CURRENT PROBLEM, REHAB EVAL
Pt s/p fall with L hip fracture. being treated nonsurgically, TTWB, at rehab for several weeks. After being discharged from rehab a week ago, he has been complaint with the nebulizer, but continued to cough with increasing frequency. The visiting nurse saw him twice and felt the lung sounds were worsening and referred him to the PMD. This morning Dr. Cosby (covering for Dr. Elizabeth) sent him to the hospital to r/o PNA.

## 2017-03-06 NOTE — DISCHARGE NOTE ADULT - PATIENT PORTAL LINK FT
“You can access the Follow[employer] Patient Portal, offered by [facility], by registering with the following website: [email or web address]”

## 2017-03-06 NOTE — PHYSICAL THERAPY INITIAL EVALUATION ADULT - ADDITIONAL COMMENTS
CT chest: Age-indeterminate left 3-9th rib fractures. No pleural effusion or pneumothorax. Mucoid impacted distal airways mainly in the lingula and bilateral lung bases, possibly superimposed bronchitis. CT chest: Age-indeterminate left 3-9th rib fractures. No pleural effusion or pneumothorax. Mucoid impacted distal airways mainly in the lingula and bilateral lung bases, possibly superimposed bronchitis.  Pt lives in a house, was ambulatory with a rolling walker 10 steps to the bathroom with the assist of the aide. + 2 steps to enter.

## 2017-03-06 NOTE — PHYSICAL THERAPY INITIAL EVALUATION ADULT - ACTIVE RANGE OF MOTION EXAMINATION, REHAB EVAL
kimberly. upper extremity Active ROM was WNL (within normal limits)/bilateral  lower extremity Active ROM was WFL (within functional limits)

## 2017-03-07 ENCOUNTER — APPOINTMENT (OUTPATIENT)
Dept: INTERNAL MEDICINE | Facility: CLINIC | Age: 82
End: 2017-03-07

## 2017-03-07 VITALS
OXYGEN SATURATION: 94 % | HEART RATE: 88 BPM | TEMPERATURE: 98 F | DIASTOLIC BLOOD PRESSURE: 65 MMHG | RESPIRATION RATE: 18 BRPM | SYSTOLIC BLOOD PRESSURE: 102 MMHG

## 2017-03-07 LAB
HCT VFR BLD CALC: 33.8 % — LOW (ref 39–50)
HGB BLD-MCNC: 11.7 G/DL — LOW (ref 13–17)
MCHC RBC-ENTMCNC: 34.4 PG — HIGH (ref 27–34)
MCHC RBC-ENTMCNC: 34.8 GM/DL — SIGNIFICANT CHANGE UP (ref 32–36)
MCV RBC AUTO: 99.1 FL — SIGNIFICANT CHANGE UP (ref 80–100)
PLATELET # BLD AUTO: 105 K/UL — LOW (ref 150–400)
RBC # BLD: 3.41 M/UL — LOW (ref 4.2–5.8)
RBC # FLD: 14.2 % — SIGNIFICANT CHANGE UP (ref 10.3–14.5)
WBC # BLD: 6.5 K/UL — SIGNIFICANT CHANGE UP (ref 3.8–10.5)
WBC # FLD AUTO: 6.5 K/UL — SIGNIFICANT CHANGE UP (ref 3.8–10.5)

## 2017-03-07 PROCEDURE — 71045 X-RAY EXAM CHEST 1 VIEW: CPT

## 2017-03-07 PROCEDURE — 87633 RESP VIRUS 12-25 TARGETS: CPT

## 2017-03-07 PROCEDURE — 82435 ASSAY OF BLOOD CHLORIDE: CPT

## 2017-03-07 PROCEDURE — 83605 ASSAY OF LACTIC ACID: CPT

## 2017-03-07 PROCEDURE — 85027 COMPLETE CBC AUTOMATED: CPT

## 2017-03-07 PROCEDURE — 82330 ASSAY OF CALCIUM: CPT

## 2017-03-07 PROCEDURE — 84295 ASSAY OF SERUM SODIUM: CPT

## 2017-03-07 PROCEDURE — 87798 DETECT AGENT NOS DNA AMP: CPT

## 2017-03-07 PROCEDURE — 87040 BLOOD CULTURE FOR BACTERIA: CPT

## 2017-03-07 PROCEDURE — 85014 HEMATOCRIT: CPT

## 2017-03-07 PROCEDURE — 94640 AIRWAY INHALATION TREATMENT: CPT

## 2017-03-07 PROCEDURE — 97162 PT EVAL MOD COMPLEX 30 MIN: CPT

## 2017-03-07 PROCEDURE — 72170 X-RAY EXAM OF PELVIS: CPT

## 2017-03-07 PROCEDURE — 71250 CT THORAX DX C-: CPT

## 2017-03-07 PROCEDURE — 80053 COMPREHEN METABOLIC PANEL: CPT

## 2017-03-07 PROCEDURE — 96374 THER/PROPH/DIAG INJ IV PUSH: CPT

## 2017-03-07 PROCEDURE — 73552 X-RAY EXAM OF FEMUR 2/>: CPT

## 2017-03-07 PROCEDURE — 99239 HOSP IP/OBS DSCHRG MGMT >30: CPT

## 2017-03-07 PROCEDURE — 99285 EMERGENCY DEPT VISIT HI MDM: CPT | Mod: 25

## 2017-03-07 PROCEDURE — 82803 BLOOD GASES ANY COMBINATION: CPT

## 2017-03-07 PROCEDURE — 82947 ASSAY GLUCOSE BLOOD QUANT: CPT

## 2017-03-07 PROCEDURE — 96375 TX/PRO/DX INJ NEW DRUG ADDON: CPT

## 2017-03-07 PROCEDURE — 87486 CHLMYD PNEUM DNA AMP PROBE: CPT

## 2017-03-07 PROCEDURE — 87581 M.PNEUMON DNA AMP PROBE: CPT

## 2017-03-07 PROCEDURE — 80048 BASIC METABOLIC PNL TOTAL CA: CPT

## 2017-03-07 PROCEDURE — 84132 ASSAY OF SERUM POTASSIUM: CPT

## 2017-03-07 PROCEDURE — 96376 TX/PRO/DX INJ SAME DRUG ADON: CPT

## 2017-03-07 RX ORDER — FLUTICASONE PROPIONATE AND SALMETEROL 50; 250 UG/1; UG/1
1 POWDER ORAL; RESPIRATORY (INHALATION)
Qty: 0 | Refills: 0 | Status: DISCONTINUED | OUTPATIENT
Start: 2017-03-07 | End: 2017-03-07

## 2017-03-07 RX ORDER — FLUTICASONE PROPIONATE AND SALMETEROL 50; 250 UG/1; UG/1
1 POWDER ORAL; RESPIRATORY (INHALATION)
Qty: 1 | Refills: 0 | OUTPATIENT
Start: 2017-03-07

## 2017-03-07 RX ADMIN — HEPARIN SODIUM 5000 UNIT(S): 5000 INJECTION INTRAVENOUS; SUBCUTANEOUS at 13:43

## 2017-03-07 RX ADMIN — Medication 30 MILLIGRAM(S): at 05:17

## 2017-03-07 RX ADMIN — Medication 3 MILLILITER(S): at 05:16

## 2017-03-07 RX ADMIN — PREGABALIN 1000 MICROGRAM(S): 225 CAPSULE ORAL at 11:27

## 2017-03-07 RX ADMIN — BUPROPION HYDROCHLORIDE 75 MILLIGRAM(S): 150 TABLET, EXTENDED RELEASE ORAL at 11:27

## 2017-03-07 RX ADMIN — FLUTICASONE PROPIONATE AND SALMETEROL 1 DOSE(S): 50; 250 POWDER ORAL; RESPIRATORY (INHALATION) at 14:16

## 2017-03-07 RX ADMIN — HEPARIN SODIUM 5000 UNIT(S): 5000 INJECTION INTRAVENOUS; SUBCUTANEOUS at 05:16

## 2017-03-07 RX ADMIN — SERTRALINE 50 MILLIGRAM(S): 25 TABLET, FILM COATED ORAL at 05:16

## 2017-03-07 RX ADMIN — Medication 3 MILLILITER(S): at 09:30

## 2017-03-07 RX ADMIN — Medication 81 MILLIGRAM(S): at 11:27

## 2017-03-07 RX ADMIN — Medication 3 MILLILITER(S): at 13:42

## 2017-03-07 RX ADMIN — MIDODRINE HYDROCHLORIDE 5 MILLIGRAM(S): 2.5 TABLET ORAL at 13:42

## 2017-03-07 RX ADMIN — MIDODRINE HYDROCHLORIDE 5 MILLIGRAM(S): 2.5 TABLET ORAL at 05:16

## 2017-03-07 RX ADMIN — Medication 3 MILLILITER(S): at 01:47

## 2017-03-09 LAB
CULTURE RESULTS: SIGNIFICANT CHANGE UP
CULTURE RESULTS: SIGNIFICANT CHANGE UP
SPECIMEN SOURCE: SIGNIFICANT CHANGE UP
SPECIMEN SOURCE: SIGNIFICANT CHANGE UP

## 2017-04-14 ENCOUNTER — APPOINTMENT (OUTPATIENT)
Dept: INTERNAL MEDICINE | Facility: CLINIC | Age: 82
End: 2017-04-14

## 2017-05-26 PROBLEM — N40.0 BENIGN PROSTATIC HYPERPLASIA WITHOUT LOWER URINARY TRACT SYMPTOMS: Chronic | Status: ACTIVE | Noted: 2017-03-04

## 2017-05-26 PROBLEM — I71.4 ABDOMINAL AORTIC ANEURYSM, WITHOUT RUPTURE: Chronic | Status: ACTIVE | Noted: 2017-03-04

## 2017-05-26 PROBLEM — I95.9 HYPOTENSION, UNSPECIFIED: Chronic | Status: ACTIVE | Noted: 2017-03-04

## 2017-05-30 ENCOUNTER — APPOINTMENT (OUTPATIENT)
Dept: PHYSICAL MEDICINE AND REHAB | Facility: CLINIC | Age: 82
End: 2017-05-30

## 2017-05-30 ENCOUNTER — MEDICATION RENEWAL (OUTPATIENT)
Age: 82
End: 2017-05-30

## 2017-05-30 VITALS
TEMPERATURE: 98.1 F | OXYGEN SATURATION: 94 % | SYSTOLIC BLOOD PRESSURE: 103 MMHG | DIASTOLIC BLOOD PRESSURE: 67 MMHG | HEART RATE: 68 BPM

## 2017-05-30 DIAGNOSIS — M54.12 RADICULOPATHY, CERVICAL REGION: ICD-10-CM

## 2017-05-30 RX ORDER — CHLORHEXIDINE GLUCONATE 4 %
1000 LIQUID (ML) TOPICAL
Refills: 0 | Status: ACTIVE | COMMUNITY
Start: 2017-05-30

## 2017-05-31 PROBLEM — M54.12 CERVICAL RADICULAR PAIN: Status: ACTIVE | Noted: 2017-05-30

## 2017-07-18 ENCOUNTER — APPOINTMENT (OUTPATIENT)
Dept: INTERNAL MEDICINE | Facility: CLINIC | Age: 82
End: 2017-07-18

## 2017-07-18 VITALS
DIASTOLIC BLOOD PRESSURE: 60 MMHG | SYSTOLIC BLOOD PRESSURE: 110 MMHG | HEART RATE: 76 BPM | BODY MASS INDEX: 28.25 KG/M2 | TEMPERATURE: 97.9 F | OXYGEN SATURATION: 95 % | WEIGHT: 175 LBS

## 2017-07-18 DIAGNOSIS — I95.1 ORTHOSTATIC HYPOTENSION: ICD-10-CM

## 2017-07-18 DIAGNOSIS — C90.00 MULTIPLE MYELOMA NOT HAVING ACHIEVED REMISSION: ICD-10-CM

## 2017-07-18 DIAGNOSIS — R26.81 UNSTEADINESS ON FEET: ICD-10-CM

## 2017-07-18 DIAGNOSIS — Z00.00 ENCOUNTER FOR GENERAL ADULT MEDICAL EXAMINATION W/OUT ABNORMAL FINDINGS: ICD-10-CM

## 2017-07-18 RX ORDER — UMECLIDINIUM 62.5 UG/1
62.5 AEROSOL, POWDER ORAL
Qty: 30 | Refills: 0 | Status: ACTIVE | COMMUNITY
Start: 2017-07-14

## 2017-07-18 RX ORDER — MONTELUKAST 10 MG/1
10 TABLET, FILM COATED ORAL
Qty: 30 | Refills: 0 | Status: ACTIVE | COMMUNITY
Start: 2017-07-13

## 2017-07-18 RX ORDER — SULFAMETHOXAZOLE AND TRIMETHOPRIM 800; 160 MG/1; MG/1
800-160 TABLET ORAL
Qty: 14 | Refills: 0 | Status: DISCONTINUED | COMMUNITY
Start: 2017-05-31

## 2017-07-18 RX ORDER — BUDESONIDE 0.5 MG/2ML
0.5 INHALANT ORAL
Qty: 60 | Refills: 0 | Status: ACTIVE | COMMUNITY
Start: 2017-06-20

## 2017-08-18 ENCOUNTER — RX RENEWAL (OUTPATIENT)
Age: 82
End: 2017-08-18

## 2017-08-31 PROBLEM — C90.00 MULTIPLE MYELOMA: Status: ACTIVE | Noted: 2017-08-31

## 2017-11-10 ENCOUNTER — MEDICATION RENEWAL (OUTPATIENT)
Age: 82
End: 2017-11-10

## 2018-03-09 ENCOUNTER — APPOINTMENT (OUTPATIENT)
Dept: INTERNAL MEDICINE | Facility: CLINIC | Age: 83
End: 2018-03-09
Payer: MEDICARE

## 2018-03-09 ENCOUNTER — LABORATORY RESULT (OUTPATIENT)
Age: 83
End: 2018-03-09

## 2018-03-09 VITALS
SYSTOLIC BLOOD PRESSURE: 86 MMHG | WEIGHT: 178 LBS | TEMPERATURE: 97.4 F | DIASTOLIC BLOOD PRESSURE: 50 MMHG | OXYGEN SATURATION: 98 % | BODY MASS INDEX: 28.61 KG/M2 | HEIGHT: 66 IN | HEART RATE: 76 BPM

## 2018-03-09 DIAGNOSIS — N50.89 OTHER SPECIFIED DISORDERS OF THE MALE GENITAL ORGANS: ICD-10-CM

## 2018-03-09 DIAGNOSIS — F03.90 UNSPECIFIED DEMENTIA W/OUT BEHAVIORAL DISTURBANCE: ICD-10-CM

## 2018-03-09 DIAGNOSIS — J44.9 CHRONIC OBSTRUCTIVE PULMONARY DISEASE, UNSPECIFIED: ICD-10-CM

## 2018-03-09 DIAGNOSIS — F32.9 MAJOR DEPRESSIVE DISORDER, SINGLE EPISODE, UNSPECIFIED: ICD-10-CM

## 2018-03-09 DIAGNOSIS — Z87.440 PERSONAL HISTORY OF URINARY (TRACT) INFECTIONS: ICD-10-CM

## 2018-03-09 DIAGNOSIS — M47.9 SPONDYLOSIS, UNSPECIFIED: ICD-10-CM

## 2018-03-09 DIAGNOSIS — Z87.81 PERSONAL HISTORY OF (HEALED) TRAUMATIC FRACTURE: ICD-10-CM

## 2018-03-09 DIAGNOSIS — R32 UNSPECIFIED URINARY INCONTINENCE: ICD-10-CM

## 2018-03-09 DIAGNOSIS — L89.302 PRESSURE ULCER OF UNSPECIFIED BUTTOCK, STAGE 2: ICD-10-CM

## 2018-03-09 PROCEDURE — 81003 URINALYSIS AUTO W/O SCOPE: CPT | Mod: QW

## 2018-03-09 PROCEDURE — 99215 OFFICE O/P EST HI 40 MIN: CPT

## 2018-03-09 RX ORDER — CLOTRIMAZOLE 10 MG/G
1 CREAM TOPICAL TWICE DAILY
Qty: 1 | Refills: 1 | Status: ACTIVE | COMMUNITY
Start: 2018-03-09 | End: 1900-01-01

## 2018-03-12 LAB
APPEARANCE: ABNORMAL
BACTERIA UR CULT: ABNORMAL
BILIRUB UR QL STRIP: NORMAL
BILIRUBIN URINE: NEGATIVE
BLOOD URINE: ABNORMAL
COLOR: ABNORMAL
GLUCOSE QUALITATIVE U: NEGATIVE MG/DL
GLUCOSE UR-MCNC: NORMAL
HCG UR QL: 0.1 EU/DL
HGB UR QL STRIP.AUTO: NORMAL
KETONES UR-MCNC: NORMAL
KETONES URINE: NEGATIVE
LEUKOCYTE ESTERASE UR QL STRIP: NORMAL
LEUKOCYTE ESTERASE URINE: ABNORMAL
NITRITE UR QL STRIP: NORMAL
NITRITE URINE: POSITIVE
PH UR STRIP: 7
PH URINE: >8.5
PROT UR STRIP-MCNC: NORMAL
PROTEIN URINE: 100 MG/DL
SP GR UR STRIP: 1.02
SPECIFIC GRAVITY URINE: 1.02
UROBILINOGEN URINE: 1 MG/DL

## 2018-03-17 ENCOUNTER — MESSAGE (OUTPATIENT)
Age: 83
End: 2018-03-17

## 2018-03-17 RX ORDER — NITROFURANTOIN (MONOHYDRATE/MACROCRYSTALS) 25; 75 MG/1; MG/1
100 CAPSULE ORAL TWICE DAILY
Qty: 14 | Refills: 0 | Status: DISCONTINUED | COMMUNITY
Start: 2018-03-09 | End: 2018-03-17

## 2018-03-17 RX ORDER — CIPROFLOXACIN HYDROCHLORIDE 250 MG/1
250 TABLET, FILM COATED ORAL
Qty: 6 | Refills: 0 | Status: ACTIVE | COMMUNITY
Start: 2018-03-17 | End: 1900-01-01

## 2018-03-17 RX ORDER — SULFAMETHOXAZOLE AND TRIMETHOPRIM 800; 160 MG/1; MG/1
800-160 TABLET ORAL TWICE DAILY
Qty: 20 | Refills: 0 | Status: DISCONTINUED | COMMUNITY
Start: 2018-03-12 | End: 2018-03-17

## 2018-04-25 PROBLEM — Z87.81 HISTORY OF FRACTURE OF HIP: Status: RESOLVED | Noted: 2018-04-25 | Resolved: 2018-04-25

## 2018-07-23 ENCOUNTER — MEDICATION RENEWAL (OUTPATIENT)
Age: 83
End: 2018-07-23

## 2018-07-23 RX ORDER — MIDODRINE HYDROCHLORIDE 5 MG/1
5 TABLET ORAL 3 TIMES DAILY
Qty: 90 | Refills: 3 | Status: ACTIVE | COMMUNITY
Start: 2017-04-06 | End: 1900-01-01

## 2018-07-30 PROBLEM — L89.302 DECUBITUS ULCER OF BUTTOCK, STAGE 2: Status: ACTIVE | Noted: 2018-03-09

## 2018-07-31 PROBLEM — F03.90 UNSPECIFIED DEMENTIA WITHOUT BEHAVIORAL DISTURBANCE: Chronic | Status: ACTIVE | Noted: 2017-03-04

## 2018-08-14 ENCOUNTER — APPOINTMENT (OUTPATIENT)
Dept: SURGICAL ONCOLOGY | Facility: CLINIC | Age: 83
End: 2018-08-14
Payer: MEDICARE

## 2018-08-14 VITALS
BODY MASS INDEX: 26.99 KG/M2 | HEIGHT: 67.5 IN | HEART RATE: 70 BPM | WEIGHT: 174 LBS | DIASTOLIC BLOOD PRESSURE: 82 MMHG | SYSTOLIC BLOOD PRESSURE: 143 MMHG | OXYGEN SATURATION: 95 %

## 2018-08-14 VITALS — TEMPERATURE: 97.6 F

## 2018-08-14 DIAGNOSIS — C43.9 MALIGNANT MELANOMA OF SKIN, UNSPECIFIED: ICD-10-CM

## 2018-08-14 PROCEDURE — 99204 OFFICE O/P NEW MOD 45 MIN: CPT

## 2018-08-17 ENCOUNTER — OUTPATIENT (OUTPATIENT)
Dept: OUTPATIENT SERVICES | Facility: HOSPITAL | Age: 83
LOS: 1 days | End: 2018-08-17
Payer: MEDICARE

## 2018-08-17 DIAGNOSIS — C43.9 MALIGNANT MELANOMA OF SKIN, UNSPECIFIED: ICD-10-CM

## 2018-08-17 DIAGNOSIS — S72.009A FRACTURE OF UNSPECIFIED PART OF NECK OF UNSPECIFIED FEMUR, INITIAL ENCOUNTER FOR CLOSED FRACTURE: Chronic | ICD-10-CM

## 2018-08-19 ENCOUNTER — APPOINTMENT (OUTPATIENT)
Dept: NUCLEAR MEDICINE | Facility: IMAGING CENTER | Age: 83
End: 2018-08-19

## 2018-08-20 ENCOUNTER — RESULT REVIEW (OUTPATIENT)
Age: 83
End: 2018-08-20

## 2018-08-20 PROCEDURE — 88321 CONSLTJ&REPRT SLD PREP ELSWR: CPT

## 2018-08-22 PROBLEM — C43.9 MELANOMA: Status: ACTIVE | Noted: 2017-05-30

## 2018-09-27 ENCOUNTER — APPOINTMENT (OUTPATIENT)
Dept: SURGICAL ONCOLOGY | Facility: HOSPITAL | Age: 83
End: 2018-09-27

## 2018-09-27 ENCOUNTER — APPOINTMENT (OUTPATIENT)
Dept: NUCLEAR MEDICINE | Facility: HOSPITAL | Age: 83
End: 2018-09-27

## 2020-12-16 PROBLEM — Z87.440 HISTORY OF URINARY TRACT INFECTION: Status: RESOLVED | Noted: 2018-03-09 | Resolved: 2020-12-16

## 2021-02-25 NOTE — PATIENT PROFILE ADULT. - PRO SERVICES AT DISCH
Upon review of the In Basket request  we are requesting that you forward this request/concern to the [employer] email  The Quality team members assigned to this email will be more than happy to assist you  We do not currently outreach for [place]  Any additional questions or concerns should be emailed to the Practice Liaisons via [email or web address] email, please do not reply via In Basket      Thank you  Ina Schmitt unsure

## 2021-04-26 NOTE — ED PROVIDER NOTE - NSCAREINITIATED _GEN_ER
Jamal Oliveira is a 76 year old male presenting for   Chief Complaint   Patient presents with   • Office Visit   • Routine Foot Care   • Diabetes     1945  [id number] Violet Houston MD   Date of last visit with Violet Houston MD 4/8/21  Hemoglobin A1C (%)   Date Value   04/01/2021 6.9 (H)     Patient refused vitals       Denies known Latex allergy or symptoms of Latex sensitivity.  Medications reviewed and updated.  Allergies reviewed and updated.       Devyn Staley(Attending)